# Patient Record
Sex: MALE | Race: WHITE | NOT HISPANIC OR LATINO
[De-identification: names, ages, dates, MRNs, and addresses within clinical notes are randomized per-mention and may not be internally consistent; named-entity substitution may affect disease eponyms.]

---

## 2020-02-25 PROBLEM — Z00.00 ENCOUNTER FOR PREVENTIVE HEALTH EXAMINATION: Status: ACTIVE | Noted: 2020-02-25

## 2020-03-03 ENCOUNTER — APPOINTMENT (OUTPATIENT)
Dept: THORACIC SURGERY | Facility: CLINIC | Age: 75
End: 2020-03-03
Payer: MEDICARE

## 2020-03-03 DIAGNOSIS — Z86.79 PERSONAL HISTORY OF OTHER DISEASES OF THE CIRCULATORY SYSTEM: ICD-10-CM

## 2020-03-03 DIAGNOSIS — R91.8 OTHER NONSPECIFIC ABNORMAL FINDING OF LUNG FIELD: ICD-10-CM

## 2020-03-03 DIAGNOSIS — Z86.39 PERSONAL HISTORY OF OTHER ENDOCRINE, NUTRITIONAL AND METABOLIC DISEASE: ICD-10-CM

## 2020-03-03 DIAGNOSIS — Z87.19 PERSONAL HISTORY OF OTHER DISEASES OF THE DIGESTIVE SYSTEM: ICD-10-CM

## 2020-03-03 DIAGNOSIS — Z87.891 PERSONAL HISTORY OF NICOTINE DEPENDENCE: ICD-10-CM

## 2020-03-03 DIAGNOSIS — J43.8 OTHER EMPHYSEMA: ICD-10-CM

## 2020-03-03 DIAGNOSIS — Z87.898 PERSONAL HISTORY OF OTHER SPECIFIED CONDITIONS: ICD-10-CM

## 2020-03-03 PROCEDURE — 99205 OFFICE O/P NEW HI 60 MIN: CPT

## 2020-03-06 PROBLEM — Z87.891 FORMER SMOKER: Status: ACTIVE | Noted: 2020-03-06

## 2020-03-06 PROBLEM — Z86.39 HISTORY OF DIABETES MELLITUS: Status: RESOLVED | Noted: 2020-03-06 | Resolved: 2020-03-06

## 2020-03-06 PROBLEM — Z87.898 HISTORY OF SLEEP DISTURBANCE: Status: RESOLVED | Noted: 2020-03-06 | Resolved: 2020-03-06

## 2020-03-06 PROBLEM — J43.8 OTHER EMPHYSEMA: Status: RESOLVED | Noted: 2020-03-06 | Resolved: 2020-03-06

## 2020-03-06 PROBLEM — Z87.19 HISTORY OF ESOPHAGEAL REFLUX: Status: RESOLVED | Noted: 2020-03-06 | Resolved: 2020-03-06

## 2020-03-06 PROBLEM — Z86.79 HISTORY OF ESSENTIAL HYPERTENSION: Status: RESOLVED | Noted: 2020-03-06 | Resolved: 2020-03-06

## 2020-03-06 PROBLEM — R91.8 LUNG MASS: Status: ACTIVE | Noted: 2020-03-06

## 2020-03-06 RX ORDER — ATENOLOL 50 MG/1
TABLET ORAL
Refills: 0 | Status: ACTIVE | COMMUNITY

## 2020-03-06 RX ORDER — OMEPRAZOLE 40 MG/1
40 CAPSULE, DELAYED RELEASE ORAL
Refills: 0 | Status: ACTIVE | COMMUNITY

## 2020-03-06 RX ORDER — ATORVASTATIN CALCIUM 80 MG/1
TABLET, FILM COATED ORAL
Refills: 0 | Status: ACTIVE | COMMUNITY

## 2020-03-06 RX ORDER — MAGNESIUM OXIDE 500 MG
500 TABLET ORAL
Refills: 0 | Status: ACTIVE | COMMUNITY

## 2020-03-06 RX ORDER — GLIMEPIRIDE 4 MG/1
TABLET ORAL
Refills: 0 | Status: ACTIVE | COMMUNITY

## 2020-03-06 RX ORDER — ZOLPIDEM TARTRATE 5 MG/1
TABLET, FILM COATED ORAL
Refills: 0 | Status: ACTIVE | COMMUNITY

## 2020-03-06 RX ORDER — LEFLUNOMIDE 10 MG/1
10 TABLET, FILM COATED ORAL
Refills: 0 | Status: ACTIVE | COMMUNITY

## 2020-03-06 RX ORDER — UMECLIDINIUM BROMIDE AND VILANTEROL TRIFENATATE 62.5; 25 UG/1; UG/1
POWDER RESPIRATORY (INHALATION)
Refills: 0 | Status: ACTIVE | COMMUNITY

## 2020-03-06 RX ORDER — METFORMIN HYDROCHLORIDE 625 MG/1
TABLET ORAL
Refills: 0 | Status: ACTIVE | COMMUNITY

## 2020-03-06 RX ORDER — OMEGA-3-ACID ETHYL ESTERS CAPSULES 1 G/1
1 CAPSULE, LIQUID FILLED ORAL
Refills: 0 | Status: ACTIVE | COMMUNITY

## 2020-03-06 RX ORDER — HYDROCHLOROTHIAZIDE 12.5 MG/1
TABLET ORAL
Refills: 0 | Status: ACTIVE | COMMUNITY

## 2020-03-06 RX ORDER — PANCRELIPASE 36000; 180000; 114000 [USP'U]/1; [USP'U]/1; [USP'U]/1
CAPSULE, DELAYED RELEASE PELLETS ORAL
Refills: 0 | Status: ACTIVE | COMMUNITY

## 2020-03-06 NOTE — PHYSICAL EXAM
[General Appearance - Alert] : alert [General Appearance - In No Acute Distress] : in no acute distress [Sclera] : the sclera and conjunctiva were normal [PERRL With Normal Accommodation] : pupils were equal in size, round, and reactive to light [Extraocular Movements] : extraocular movements were intact [Outer Ear] : the ears and nose were normal in appearance [Oropharynx] : the oropharynx was normal [Neck Appearance] : the appearance of the neck was normal [Neck Cervical Mass (___cm)] : no neck mass was observed [Jugular Venous Distention Increased] : there was no jugular-venous distention [Thyroid Diffuse Enlargement] : the thyroid was not enlarged [Thyroid Nodule] : there were no palpable thyroid nodules [Auscultation Breath Sounds / Voice Sounds] : lungs were clear to auscultation bilaterally [Heart Rate And Rhythm] : heart rate was normal and rhythm regular [Heart Sounds] : normal S1 and S2 [Heart Sounds Gallop] : no gallops [Murmurs] : no murmurs [Heart Sounds Pericardial Friction Rub] : no pericardial rub [Examination Of The Chest] : the chest was normal in appearance [Chest Visual Inspection Thoracic Asymmetry] : no chest asymmetry [Diminished Respiratory Excursion] : normal chest expansion [Bowel Sounds] : normal bowel sounds [Abdomen Soft] : soft [Abdomen Tenderness] : non-tender [Abdomen Mass (___ Cm)] : no abdominal mass palpated [Cervical Lymph Nodes Enlarged Posterior Bilaterally] : posterior cervical [Cervical Lymph Nodes Enlarged Anterior Bilaterally] : anterior cervical [Supraclavicular Lymph Nodes Enlarged Bilaterally] : supraclavicular [Axillary Lymph Nodes Enlarged Bilaterally] : axillary [Femoral Lymph Nodes Enlarged Bilaterally] : femoral [Inguinal Lymph Nodes Enlarged Bilaterally] : inguinal [No CVA Tenderness] : no ~M costovertebral angle tenderness [No Spinal Tenderness] : no spinal tenderness [Skin Color & Pigmentation] : normal skin color and pigmentation [Skin Turgor] : normal skin turgor [] : no rash

## 2020-03-06 NOTE — HISTORY OF PRESENT ILLNESS
[FreeTextEntry1] : 75 y/o male with h/o right upper lobe wedge resection for lung cancer in 2016.  Pt had stage 1 lung ca.  Recent pet ct shows evidence of recurrence that is pet positive on the staple line.  No other evidence for disease.  Pt here to discuss completion right upper lobectomy.  No other complaints.  Pt denies trey loss.  Breathing well.

## 2020-03-06 NOTE — HISTORY OF PRESENT ILLNESS
[FreeTextEntry1] : 73 y/o male with h/o right upper lobe wedge resection for lung cancer in 2016.  Pt had stage 1 lung ca.  Recent pet ct shows evidence of recurrence that is pet positive on the staple line.  No other evidence for disease.  Pt here to discuss completion right upper lobectomy.  No other complaints.  Pt denies trey loss.  Breathing well.

## 2020-03-06 NOTE — ASSESSMENT
[FreeTextEntry1] : 75 y/o male with evidence for recurrence of NSCLC at the staple line of the right upper lobe wedge.  Pt without any other evidence for disease.  Options discussed in detail.  I recommend a completion right upper lobectomy with mediastinal lymph node dissection.  Risks and benefits discussed in detail.  Will discuss with patients pulmonologist, Dr. Ledesma.  Pt will need pfts and cardiology clearance.

## 2020-03-06 NOTE — DATA REVIEWED
[FreeTextEntry1] : CT Chest 1/21/20: No pleural effusion. Marked bilateral centrilobular emphysematous change. There are posttreatment changes in the right lung s/p resection of RUL adenocarcinoma. There is a new 10 mm irregular subpleural density noted anteriorly at the right lung apex. There is a calcified granuloma in the superior segment of the RLL. There is a stable 3 mm nodule at the right lung base. There is a 2 mm subpleural nodule in the ERICA. There is no mediastinal, hilar or axillary lymphadenopathy. \par \par PET: 2/10/2020 small nodular hypermetabolic uptake corresponding to the postsurgical scar at the level of a wedge resection RUL. The hypermetabolic area demonstrated SUV max 4.5. There was no hypermetabolic uptake in this region on prior PETCT. Review of the recent diagnostic chest CT 1/21/20 suggests some subtle soft tissue thickening in this region likely increased since 2018 and the appearance considered very suspicious for recurrent cancer at the operative site. Corresponding to the new 1 cm RUL anteriorly there is mild uptake SUV max 1.6 and this is nonspecific. 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

## 2020-04-01 ENCOUNTER — APPOINTMENT (OUTPATIENT)
Dept: THORACIC SURGERY | Facility: HOSPITAL | Age: 75
End: 2020-04-01

## 2020-04-28 ENCOUNTER — APPOINTMENT (OUTPATIENT)
Dept: THORACIC SURGERY | Facility: HOSPITAL | Age: 75
End: 2020-04-28

## 2020-05-26 ENCOUNTER — APPOINTMENT (OUTPATIENT)
Dept: THORACIC SURGERY | Facility: CLINIC | Age: 75
End: 2020-05-26

## 2022-01-01 ENCOUNTER — TRANSCRIPTION ENCOUNTER (OUTPATIENT)
Age: 77
End: 2022-01-01

## 2022-01-01 ENCOUNTER — APPOINTMENT (OUTPATIENT)
Dept: THORACIC SURGERY | Facility: CLINIC | Age: 77
End: 2022-01-01